# Patient Record
Sex: MALE | Race: WHITE | NOT HISPANIC OR LATINO | Employment: FULL TIME | URBAN - METROPOLITAN AREA
[De-identification: names, ages, dates, MRNs, and addresses within clinical notes are randomized per-mention and may not be internally consistent; named-entity substitution may affect disease eponyms.]

---

## 2023-12-19 ENCOUNTER — CONSULT (OUTPATIENT)
Dept: ENDOCRINOLOGY | Facility: CLINIC | Age: 28
End: 2023-12-19
Payer: COMMERCIAL

## 2023-12-19 ENCOUNTER — LAB (OUTPATIENT)
Dept: LAB | Facility: LAB | Age: 28
End: 2023-12-19
Payer: COMMERCIAL

## 2023-12-19 VITALS
SYSTOLIC BLOOD PRESSURE: 144 MMHG | HEIGHT: 73 IN | TEMPERATURE: 98.3 F | WEIGHT: 151 LBS | DIASTOLIC BLOOD PRESSURE: 84 MMHG | HEART RATE: 47 BPM | BODY MASS INDEX: 20.01 KG/M2

## 2023-12-19 DIAGNOSIS — Z31.41 FERTILITY TESTING: ICD-10-CM

## 2023-12-19 DIAGNOSIS — Z11.3 ENCOUNTER FOR SCREENING EXAMINATION FOR SEXUALLY TRANSMITTED DISEASE: Primary | ICD-10-CM

## 2023-12-19 DIAGNOSIS — Z11.3 ENCOUNTER FOR SCREENING EXAMINATION FOR SEXUALLY TRANSMITTED DISEASE: ICD-10-CM

## 2023-12-19 DIAGNOSIS — Z13.71 SCREENING FOR GENETIC DISEASE CARRIER STATUS: ICD-10-CM

## 2023-12-19 LAB
HBV SURFACE AG SERPL QL IA: NONREACTIVE
HCV AB SER QL: NONREACTIVE
T PALLIDUM AB SER QL: NONREACTIVE

## 2023-12-19 PROCEDURE — 99212 OFFICE O/P EST SF 10 MIN: CPT | Performed by: NURSE PRACTITIONER

## 2023-12-19 PROCEDURE — 87800 DETECT AGNT MULT DNA DIREC: CPT

## 2023-12-19 PROCEDURE — 99202 OFFICE O/P NEW SF 15 MIN: CPT | Performed by: NURSE PRACTITIONER

## 2023-12-19 PROCEDURE — 86780 TREPONEMA PALLIDUM: CPT

## 2023-12-19 PROCEDURE — 87340 HEPATITIS B SURFACE AG IA: CPT

## 2023-12-19 PROCEDURE — 86803 HEPATITIS C AB TEST: CPT

## 2023-12-19 PROCEDURE — 87389 HIV-1 AG W/HIV-1&-2 AB AG IA: CPT

## 2023-12-19 RX ORDER — BISMUTH SUBSALICYLATE 262 MG
1 TABLET,CHEWABLE ORAL DAILY
COMMUNITY

## 2023-12-19 RX ORDER — UBIDECARENONE 30 MG
30 CAPSULE ORAL DAILY
COMMUNITY

## 2023-12-19 ASSESSMENT — PAIN SCALES - GENERAL: PAINLEVEL: 0-NO PAIN

## 2023-12-20 LAB
C TRACH RRNA SPEC QL NAA+PROBE: NEGATIVE
HIV 1+2 AB+HIV1 P24 AG SERPL QL IA: NONREACTIVE
N GONORRHOEA DNA SPEC QL PROBE+SIG AMP: NEGATIVE

## 2023-12-28 ENCOUNTER — APPOINTMENT (OUTPATIENT)
Dept: ENDOCRINOLOGY | Facility: CLINIC | Age: 28
End: 2023-12-28
Payer: COMMERCIAL

## 2023-12-28 ENCOUNTER — ANCILLARY PROCEDURE (OUTPATIENT)
Dept: ENDOCRINOLOGY | Facility: CLINIC | Age: 28
End: 2023-12-28
Payer: COMMERCIAL

## 2023-12-28 DIAGNOSIS — Z31.41 FERTILITY TESTING: ICD-10-CM

## 2023-12-28 LAB
ABSTINENCE (DAYS): 2 DAYS (ref 2–7)
AGGLUTINATION (SEMEN): NO
ANALYZED TIME:: ABNORMAL
ANDROLOGY LAB ID#: ABNORMAL
CLUMPS (SEMEN): NO
COLLECTED COMPLETELY: YES
COLLECTION LOCATION:: ABNORMAL
COLLECTION METHOD:: ABNORMAL
CONCENTRATION(SEMEN): 0.17 MILL/ML (ref 15–?)
DEBRIS (SEMEN): YES
LEUKOCYTE (SEMEN): ABNORMAL
NON PROG. MOTILITY (SEMEN): 0 %
PROG. MOTILITY (SEMEN): 17 % (ref 32–?)
RECEIVED TIME:: ABNORMAL
SEMEN APPEARANCE: NORMAL
SEMEN LIQUEFACTION: NORMAL
SEMEN VISCOSITY: NORMAL
TOTAL MOTILITY (SEMEN): 17 % (ref 40–?)
TOTAL NO OF MOTILE (SEMEN): 0.11 MILL
TOTAL NO OF SPERM (SEMEN): 0.61 MILL (ref 39–?)
VOLUME (SEMEN): 3.6 ML (ref 1.5–?)

## 2023-12-28 PROCEDURE — 89321 SEMEN ANAL SPERM DETECTION: CPT | Performed by: OBSTETRICS & GYNECOLOGY

## 2024-01-04 ENCOUNTER — APPOINTMENT (OUTPATIENT)
Dept: ENDOCRINOLOGY | Facility: CLINIC | Age: 29
End: 2024-01-04
Payer: COMMERCIAL

## 2024-01-05 NOTE — PROGRESS NOTES
Pt is a 28 year old male presenting with partner Asia Bobo for fertility evaluation.     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    Prior fertility history:  none  PMH:  none  PSH:  none  Smoking:Partner: Recent or current tobacco use: No    Alcohol Use: Partner: Recent or current alcohol use: No    Drug Use: Partner: Recent or current drug use: Yes  Mariajuana Gummy and Vape     Medications: Partner Medications: Coq10, MVI, fish oil    Injuries: Partner: Any history of surgeries or injuries in the reproductive area?: No    STD: Have you ever been diagnosed with a sexually transmitted disease?: No    Please select all that are applicable:    SA: Yes at home, abnormal   SA Results:  abnormal per pt     28 year old male with partner Asia Bobo for fertility evaluation.   Abnormal at home Semen Analysis     Discussed Male Vitamins as follows:  Co-Q10   200 mg daily  L-Carnitine 200-500 mg daily  Vitamin C 500 mg daily  Recommend Consult with Dr. Cabrera [phone]  Recommend repeat SA with 48-72 hours abstinence      Plan:   Yes Semen Analysis: Ordered  Yes Genetic screening: Ordered    FOLLOW UP   Consults: Schedule consult with Dr. Cabrera

## 2024-01-30 ENCOUNTER — LAB (OUTPATIENT)
Dept: LAB | Facility: LAB | Age: 29
End: 2024-01-30
Payer: COMMERCIAL

## 2024-01-30 ENCOUNTER — OFFICE VISIT (OUTPATIENT)
Dept: UROLOGY | Facility: CLINIC | Age: 29
End: 2024-01-30
Payer: COMMERCIAL

## 2024-01-30 VITALS — BODY MASS INDEX: 19.92 KG/M2 | TEMPERATURE: 98 F | HEIGHT: 73 IN

## 2024-01-30 DIAGNOSIS — Z11.3 SCREENING EXAMINATION FOR STD (SEXUALLY TRANSMITTED DISEASE): ICD-10-CM

## 2024-01-30 DIAGNOSIS — N46.01 AZOOSPERMIA: ICD-10-CM

## 2024-01-30 DIAGNOSIS — I86.1 VARICOCELE: ICD-10-CM

## 2024-01-30 DIAGNOSIS — N46.11 OLIGOSPERMIA: ICD-10-CM

## 2024-01-30 DIAGNOSIS — N46.9 INFERTILITY MALE: ICD-10-CM

## 2024-01-30 DIAGNOSIS — F17.200 VAPING NICOTINE DEPENDENCE, NON-TOBACCO PRODUCT: ICD-10-CM

## 2024-01-30 DIAGNOSIS — N46.9 INFERTILITY MALE: Primary | ICD-10-CM

## 2024-01-30 DIAGNOSIS — R68.82 DECREASED LIBIDO: ICD-10-CM

## 2024-01-30 LAB
ESTRADIOL SERPL-MCNC: 27 PG/ML
FSH SERPL-ACNC: 7.2 IU/L
HBV SURFACE AB SER-ACNC: 7.1 MIU/ML
HBV SURFACE AG SERPL QL IA: NONREACTIVE
HCV AB SER QL: NONREACTIVE
HIV 1+2 AB+HIV1 P24 AG SERPL QL IA: NONREACTIVE
LH SERPL-ACNC: 1.1 IU/L
PROLACTIN SERPL-MCNC: 3.7 UG/L (ref 2–18)
TESTOST SERPL-MCNC: 270 NG/DL (ref 240–1000)
TREPONEMA PALLIDUM IGG+IGM AB [PRESENCE] IN SERUM OR PLASMA BY IMMUNOASSAY: NONREACTIVE

## 2024-01-30 PROCEDURE — 99406 BEHAV CHNG SMOKING 3-10 MIN: CPT | Performed by: UROLOGY

## 2024-01-30 PROCEDURE — 36415 COLL VENOUS BLD VENIPUNCTURE: CPT

## 2024-01-30 PROCEDURE — 83001 ASSAY OF GONADOTROPIN (FSH): CPT

## 2024-01-30 PROCEDURE — 87340 HEPATITIS B SURFACE AG IA: CPT

## 2024-01-30 PROCEDURE — 99204 OFFICE O/P NEW MOD 45 MIN: CPT | Performed by: UROLOGY

## 2024-01-30 PROCEDURE — 86803 HEPATITIS C AB TEST: CPT

## 2024-01-30 PROCEDURE — 81403 MOPATH PROCEDURE LEVEL 4: CPT

## 2024-01-30 PROCEDURE — 82670 ASSAY OF TOTAL ESTRADIOL: CPT

## 2024-01-30 PROCEDURE — 88262 CHROMOSOME ANALYSIS 15-20: CPT

## 2024-01-30 PROCEDURE — 84146 ASSAY OF PROLACTIN: CPT

## 2024-01-30 PROCEDURE — 88230 TISSUE CULTURE LYMPHOCYTE: CPT

## 2024-01-30 PROCEDURE — 87389 HIV-1 AG W/HIV-1&-2 AB AG IA: CPT

## 2024-01-30 PROCEDURE — 88289 CHROMOSOME STUDY ADDITIONAL: CPT

## 2024-01-30 PROCEDURE — 83002 ASSAY OF GONADOTROPIN (LH): CPT

## 2024-01-30 PROCEDURE — 84403 ASSAY OF TOTAL TESTOSTERONE: CPT

## 2024-01-30 PROCEDURE — 86780 TREPONEMA PALLIDUM: CPT

## 2024-01-30 PROCEDURE — 1036F TOBACCO NON-USER: CPT | Performed by: UROLOGY

## 2024-01-30 PROCEDURE — 86706 HEP B SURFACE ANTIBODY: CPT

## 2024-01-30 PROCEDURE — 87800 DETECT AGNT MULT DNA DIREC: CPT

## 2024-01-30 PROCEDURE — 88280 CHROMOSOME KARYOTYPE STUDY: CPT

## 2024-01-30 ASSESSMENT — PAIN SCALES - GENERAL: PAINLEVEL: 0-NO PAIN

## 2024-01-30 NOTE — PROGRESS NOTES
"HPI:  29 y.o. yo male  referred to me by   *** for infertility    Partner/wife name:   Partner/wife  :   Attempting Pregnancy for :   months  Previous pregnancies for either partner:     Previous Semen analysis / Labs:     No results found for: \"TESTOSTERONE\"  No results found for: \"LH\"  No results found for: \"FSH\"  No components found for: \"ESTRADIAL\"  No results found for: \"PROLACTIN\"    PREVIOUS RISK FACTORS  History of testicular exposure to chemicals, radiation, or toxins: None  History of high fever, epididymitis, orchitis, prostatitis, sexually transmitted diseases, or trauma to the testicles: None  History of a varicocele, testicular torsion, cryptorchidism, postpubertile mumps or a family history of infertility: None  Coital Frequency:   Coital Lubricants: None  Problems with erections or ejaculation: None  Smoker?  Previous Testosterone or anabolic steroid Use: none      PRIOR Assisted Reproductive Technology:  None      PMH:  No past medical history on file.     PSH:  No past surgical history on file.     Medications:    Current Outpatient Medications:     co-enzyme Q-10 30 mg capsule, Take 1 capsule (30 mg) by mouth once daily., Disp: , Rfl:     docosahexaenoic acid/epa (FISH OIL ORAL), Take by mouth., Disp: , Rfl:     multivitamin tablet, Take 1 tablet by mouth once daily., Disp: , Rfl:     vitamin D3-vitamin K2, MK4, 1,000-100 unit-mcg tablet, Take 1,000 Units by mouth once daily., Disp: , Rfl:     Allergy:  No Known Allergies     Exam  Testicles descended bilaterally, nontender, no masses  Vasa palpable bilaterally  Penis circ'd, no lesions, no plaques      Assessment/Plan  #infertility  I counseled the patient in detail regarding infertility, specifically the male component. We reviewed different causes of male infertility as well as options to optimize male fertility, to different surgical interventions and assisted reproductive technologies.    Semen analysis with strict morphology x " 2.  Fertility Panel:   serum Estradiol, FSH + LH, serum Prolactin, Testosterone   I have scheduled a scrotal ultrasound to better evaluate the testicles, epididymis, and spermatic cords.  Karyotype and y-chromosome microdeletion   Evaluate post-ejaculatory urine / retrograde analysis  CFTR testing  Semen culture  DNA fragmentation    Fu after    Scribe Attestation  By signing my name below, I, Michelle Corona, attest that this documentation  has been prepared under the direction and in the presence of Teresa Cabrera MD.

## 2024-01-30 NOTE — PROGRESS NOTES
HPI:  29 y.o. yo male  referred to me by Dr. Sarah Leger for infertility    -wife is seeing Dr. Sarah Leger @ [facility]  -has cut down on vaping and marijuana usage since home sperm test showed low counts  -has anxiety around trying for pregnancy due to wife carrying BRCA gene  -recent trouble with libido and mild erection troubles    Partner/wife name: Asia Bobo  Partner/wife  : 3/14/94  Attempting Pregnancy for : 5  months  Previous pregnancies for either partner: 0    Previous Semen analysis / Labs:   Component      Latest Ref Rng 2023   Volume (Semen)      1.5 mL 3.6    Concentration(Semen)      15 mill/mL 0.17 !    Total Motility (Semen)      40 % 17 !    Prog. Motility (Semen)      32 % 17 !    Non Prog. Motility (Semen)      % 0    Total No of Sperm (Semen)      39 mill 0.61 !    Total No of Motile (Semen)      mill 0.11      Component      Latest Ref Rng 2023   Hepatitis B Surface AG      Nonreactive  Nonreactive    Hepatitis C AB      Nonreactive  Nonreactive    HIV 1/2 Antigen/Antibody Screen with Reflex to Confirmation      Nonreactive  Nonreactive    Syphilis Total Ab      Nonreactive  Nonreactive        PREVIOUS RISK FACTORS  History of testicular exposure to chemicals, radiation, or toxins: None  History of high fever, epididymitis, orchitis, prostatitis, sexually transmitted diseases, or trauma to the testicles: None  History of a varicocele, testicular torsion, cryptorchidism, postpubertile mumps or a family history of infertility: None  Coital Frequency:   Coital Lubricants: None  Problems with erections or ejaculation: None  Smoker?  Previous Testosterone or anabolic steroid Use: none      PRIOR Assisted Reproductive Technology:  None      PMH:  No past medical history on file.     PSH:  No past surgical history on file.     Medications:    Current Outpatient Medications:     co-enzyme Q-10 30 mg capsule, Take 1 capsule (30 mg) by mouth once daily., Disp: , Rfl:     docosahexaenoic  acid/epa (FISH OIL ORAL), Take by mouth., Disp: , Rfl:     multivitamin tablet, Take 1 tablet by mouth once daily., Disp: , Rfl:     vitamin D3-vitamin K2, MK4, 1,000-100 unit-mcg tablet, Take 1,000 Units by mouth once daily., Disp: , Rfl:     Allergy:  No Known Allergies     Exam  Testicles descended bilaterally, 15cc, nontender, no masses  Grade 3 varicocele on Left  Grade 2 varicocele on Right  Vasa palpable bilaterally  Penis circ'd, no lesions, no plaques    Assessment/Plan  #infertility/oligospermia   I counseled the patient in detail regarding infertility, specifically the male component. We reviewed different causes of male infertility as well as options to optimize male fertility, to different surgical interventions and assisted reproductive technologies.    Semen analysis with sperm banking.  Fertility Panel:   serum Estradiol, FSH + LH, serum Prolactin, Testosterone   I have scheduled a scrotal ultrasound to better evaluate the testicles, epididymis, and spermatic cords.  Karyotype and y-chromosome microdeletion     #Varicocele - bilateral  -discussed varicocele repair in detail  -advised the patient that I recommend sperm banking prior to repair due to the possibility of repair decreasing his sperm counts even further    #Decreased Libido  -will obtain fertility panel to check T level  -discussed cessation of vaping and marijuana usage in the setting of decreased libido and erectile dysfunction    #Vaping Cessation   -Encouraged cessation of smoking.      Fu after testing    Scribe Attestation  By signing my name below, I, Michelle Corona, attest that this documentation  has been prepared under the direction and in the presence of Teresa Cabrera MD.

## 2024-01-31 ENCOUNTER — HOSPITAL ENCOUNTER (OUTPATIENT)
Dept: RADIOLOGY | Facility: CLINIC | Age: 29
Discharge: HOME | End: 2024-01-31
Payer: COMMERCIAL

## 2024-01-31 DIAGNOSIS — I86.1 VARICOCELE: ICD-10-CM

## 2024-01-31 LAB
C TRACH RRNA SPEC QL NAA+PROBE: NEGATIVE
N GONORRHOEA DNA SPEC QL PROBE+SIG AMP: NEGATIVE

## 2024-01-31 PROCEDURE — 93976 VASCULAR STUDY: CPT | Performed by: RADIOLOGY

## 2024-01-31 PROCEDURE — 93975 VASCULAR STUDY: CPT

## 2024-01-31 PROCEDURE — 76870 US EXAM SCROTUM: CPT | Performed by: RADIOLOGY

## 2024-02-05 ENCOUNTER — APPOINTMENT (OUTPATIENT)
Dept: UROLOGY | Facility: HOSPITAL | Age: 29
End: 2024-02-05
Payer: COMMERCIAL

## 2024-02-08 ENCOUNTER — APPOINTMENT (OUTPATIENT)
Dept: ENDOCRINOLOGY | Facility: CLINIC | Age: 29
End: 2024-02-08
Payer: COMMERCIAL

## 2024-02-12 LAB
ELECTRONICALLY SIGNED BY: NORMAL
Y CHROM DEL BLD/T: NORMAL

## 2024-02-27 ENCOUNTER — APPOINTMENT (OUTPATIENT)
Dept: UROLOGY | Facility: CLINIC | Age: 29
End: 2024-02-27
Payer: COMMERCIAL

## 2024-03-21 LAB
CELLS ANALYZED: 6 CELLS
CHROM ANALY OVERALL INTERP-IMP: NORMAL
CHROMOS CYTO BASIC ASSOC OBS PNL BLD/T: 5 CELLS
CHROMOSOME ANALYSIS MASTER PANEL: 0 CELLS
CHROMOSOME ANALYSIS MASTER PANEL: 0 CELLS
CHROMOSOME ANALYSIS MASTER PANEL: 46 CHROMOSOMES
CHROMOSOME ANALYSIS MASTER PANEL: NORMAL
ELECTRONICALLY SIGNED BY CYTOGENETICS: NORMAL
ISCN BAND LEVEL QL: 550 BANDS
KARYOTYP BLD/T: 3 CELLS
TOTAL CELLS COUNTED BLD: 30 CELLS

## 2024-10-18 DIAGNOSIS — N46.9 INFERTILITY MALE: ICD-10-CM

## 2025-07-18 ENCOUNTER — APPOINTMENT (OUTPATIENT)
Dept: PRIMARY CARE | Facility: CLINIC | Age: 30
End: 2025-07-18
Payer: COMMERCIAL

## 2025-07-18 VITALS
HEART RATE: 54 BPM | BODY MASS INDEX: 20.01 KG/M2 | HEIGHT: 73 IN | DIASTOLIC BLOOD PRESSURE: 85 MMHG | SYSTOLIC BLOOD PRESSURE: 134 MMHG | WEIGHT: 151 LBS | OXYGEN SATURATION: 98 %

## 2025-07-18 DIAGNOSIS — Z00.00 HEALTH CARE MAINTENANCE: Primary | ICD-10-CM

## 2025-07-18 DIAGNOSIS — E29.1 HYPOGONADISM IN MALE: ICD-10-CM

## 2025-07-18 DIAGNOSIS — N63.20 MASS OF LEFT BREAST, UNSPECIFIED QUADRANT: ICD-10-CM

## 2025-07-18 DIAGNOSIS — K64.4 EXTERNAL HEMORRHOID: ICD-10-CM

## 2025-07-18 PROCEDURE — 1036F TOBACCO NON-USER: CPT | Performed by: INTERNAL MEDICINE

## 2025-07-18 PROCEDURE — 3008F BODY MASS INDEX DOCD: CPT | Performed by: INTERNAL MEDICINE

## 2025-07-18 PROCEDURE — 99395 PREV VISIT EST AGE 18-39: CPT | Performed by: INTERNAL MEDICINE

## 2025-07-18 RX ORDER — HYDROCORTISONE 25 MG/G
CREAM TOPICAL 4 TIMES DAILY PRN
Qty: 30 G | Refills: 0 | Status: SHIPPED | OUTPATIENT
Start: 2025-07-18 | End: 2026-07-18

## 2025-07-18 ASSESSMENT — PATIENT HEALTH QUESTIONNAIRE - PHQ9
2. FEELING DOWN, DEPRESSED OR HOPELESS: NOT AT ALL
SUM OF ALL RESPONSES TO PHQ9 QUESTIONS 1 AND 2: 0
1. LITTLE INTEREST OR PLEASURE IN DOING THINGS: NOT AT ALL

## 2025-07-18 NOTE — PROGRESS NOTES
"Subjective   Patient ID: Rex Jimenez is a 30 y.o. male who presents for Annual Exam.          HPI   Patient is a 30-year-old male with past medical history of hypogonadism presents for wellness exam.  Patient has not been seen in over a year.  He works as a [employer] .  He is trained as a physical therapist.  He is  and has a 4-month-old at home.  He is getting at least 6 hours of sleep.  Uses CBD Gummies.  No smoking.  No excessive alcohol.  Exercises regularly.  He is concerned about a mass of the left breast has been present for several months.  Comes and goes.  No drainage from the nipple.    Also complaining of irritation of the anal area.  He feels a lump in the area.  No bleeding.  No history of colonoscopies.        Review of Systems  Constitutional: No fever or chills, No Night Sweats  Eyes: No Blurry Vision or Eye sight problems  ENT: No Nasal Discharge, Hoarseness, sore throat  Cardiovascular: no chest pain, no palpitations and no syncope.   Respiratory: no cough, no shortness of breath during exertion and no shortness of breath at rest.   Gastrointestinal: no abdominal pain, no nausea and no vomiting.   : No issues with urinary stream, burning with urination, no blood in urine or stools  Skin: No Skin rashes or Lesions  Neuro: No Headache, no dizziness or Numbness or tingling  Psych: No Anxiety, depression or sleeping problems  Heme: No Easy bleeding or brusing.     Objective   /85   Pulse 54   Ht 1.854 m (6' 1\")   Wt 68.5 kg (151 lb)   SpO2 98%   BMI 19.92 kg/m²     Physical Exam  Constitutional: Alert and in no acute distress. Well developed, well nourished.   Head and Face: Head and face: Normal.    Eyes: Normal external exam. Pupils were equal in size, round, reactive to light (PERRL) with normal accommodation and extraocular movements intact (EOMI).   Ears, Nose, Mouth, and Throat: External inspection of ears and nose: Normal.  Hearing: Normal.  Nasal mucosa, " septum, and turbinates: Normal.  Lips, teeth, and gums: Normal.  Oropharynx: Normal.   Neck: No neck mass was observed. Supple. Thyroid not enlarged and there were no palpable thyroid nodules.   Cardiovascular: Heart rate and rhythm were normal, normal S1 and S2. Pedal pulses: Normal. No peripheral edema.   Pulmonary: No respiratory distress. Clear bilateral breath sounds.   Abdomen: Soft nontender; no abdominal mass palpated. Normal bowel sounds. No organomegaly.  External hemorrhoid noted rather large  : Deferred  Musculoskeletal: No joint swelling seen, normal movements of all extremities. Range of motion: Normal.  Muscle strength/tone: Normal.    Skin: Normal skin color and pigmentation, normal skin turgor, and no rash.   Neurologic: Deep tendon reflexes were 2+ and symmetric.   Psychiatric: Judgment and insight: Intact. Mood and affect: Normal.  Lymphatic: No cervical lymphadenopathy. Palpation of lymph nodes in axillae: Normal.  Palpation of lymph nodes in groin: Normal.    Lab Results   Component Value Date    WBC 4.5 07/22/2022    HGB 15.4 07/22/2022    HCT 47.2 07/22/2022     07/22/2022    CHOL 146 07/22/2022    TRIG 52 07/22/2022    HDL 50.7 07/22/2022    ALT 16 07/22/2022    AST 17 07/22/2022     07/22/2022    K 4.1 07/22/2022     07/22/2022    CREATININE 0.98 07/22/2022    BUN 16 07/22/2022    CO2 27 07/22/2022    TSH 2.05 07/22/2022       US scrotum w doppler  Narrative: Interpreted By:  Wil Polanco,   STUDY:  US SCROTUM WITH DOPPLER  1/31/2024 8:46 am      INDICATION:  30 y/o   M with  Signs/Symptoms:bilateral varicoceles on exam.      COMPARISON:  None.      ACCESSION NUMBER(S):  [id number]      ORDERING CLINICIAN:  YISSEL BORRERO      TECHNIQUE:  Routine ultrasound of the scrotum was performed with duplex Doppler  (color and spectral) evaluation.   Static images were obtained for  remote interpretation.      FINDINGS:  RIGHT TESTICLE:  Size:  2.3 x 1.8x 3.5,4,3.5,4       Homogeneous in echotexture without focal lesion. It demonstrates  color/arterial flow.      LEFT TESTICLE:  Size:  2.5 x 1.6 x      Homogeneous in echotexture without focal lesion. It demonstrates  color/arterial flow.      EPIDIDYMIDES:   2 right-sided epididymal cysts/spermatoceles  measuring up to 3 mm in size. Heterogeneous left epididymis without a  discrete solid or cystic lesion.      HYDROCELE:   Trace right-sided hydrocele.      Small bilateral varicoceles are noted, measuring up to approximately  3.6 mm in diameter on the left.      Impression: Trace right hydrocele. Bilateral varicoceles. No intratesticular  mass. Tiny right epididymal cysts/spermatoceles.      MACRO:  None.      Signed by: Wil Polanco 2/1/2024 5:54 PM  Dictation workstation:   [id number]      Assessment/Plan   Problem List Items Addressed This Visit    None  Visit Diagnoses         Codes      Health care maintenance    -  Primary Z00.00    Relevant Orders    Comprehensive metabolic panel    Lipid Panel    Lipoprotein A (LPA)    Testosterone, total and free    Estradiol    Luteinizing hormone    FSH      Mass of left breast, unspecified quadrant     N63.20    Relevant Orders    BI US breast complete left    FSH      Hypogonadism in male     E29.1    Relevant Orders    Testosterone, total and free    Estradiol    Luteinizing hormone    FSH      External hemorrhoid     K64.4    Relevant Medications    hydrocortisone (Anusol-HC) 2.5 % rectal cream    Other Relevant Orders    Referral to Colorectal Surgery          Given history of hypogonadism we will recheck his sex hormones.  Continue following with urology as recommended.    Will refer to colorectal team given the size of the external hemorrhoid.  Start Anusol cream twice daily for 7 days.  Avoid straining on the toilet.  Colace for constipation    Given the left breast mass will obtain ultrasound of the breast.  Likely an epidermal cyst    Dear Rex Jimenez     It was my pleasure  to take care of you today in the office. Below are the things we discussed today:    1. Immunizations: Yearly Flu shot is recommended.       2. Blood Work: Ordered  3. Seen your dentist twice a year  4. Yearly Eye exam is recommended    5. BMI: 19.9  6: Diet recommendations:   Eat Clean, Try to have as many home cooked meals as possible  Avoid processed foods which contain excess calories, sugar, and sodium.    7. Exercise recommendations:   150 minutes a week to maintain your weight     If you have to loose weight, you need a better diet and exercise plan.     8. Please get your Living will / Advance directive completed if you do not have one already. Please make sure our office has a copy of the latest one.     9. Colonoscopy: N/A  10. PSA: N/A    11.  Follow-up annually or as needed    Follow up in one year for a Physical. Please call the office before your Physical to see if you need blood work completed prior to your physical.     Please call me if any questions arise from now until your next visit. I will call you after I am done seeing patients. A Doctor is always available by phone when the office is closed. Please feel free to call for help with any problem that you feel shouldn't wait until the office re-opens.

## 2025-07-18 NOTE — PATIENT INSTRUCTIONS
We kindly ask that you take the lead and scheduling your referral appointments to ensure they align best with your availability by calling [phone].  For laboratory tests we encourage you to schedule an appointment online [email or web address] but walk-ins are available as well.  For radiology testing you can call [phone] or [phone] to schedule.  If for any reason you are having difficulty scheduling your appointments please feel free to reach out at our office by calling [phone] to assist further

## 2025-08-01 ENCOUNTER — OFFICE VISIT (OUTPATIENT)
Dept: SURGERY | Facility: CLINIC | Age: 30
End: 2025-08-01
Payer: COMMERCIAL

## 2025-08-01 ENCOUNTER — TELEPHONE (OUTPATIENT)
Dept: SURGERY | Facility: HOSPITAL | Age: 30
End: 2025-08-01

## 2025-08-01 VITALS
DIASTOLIC BLOOD PRESSURE: 96 MMHG | BODY MASS INDEX: 20.41 KG/M2 | WEIGHT: 154 LBS | HEIGHT: 73 IN | HEART RATE: 55 BPM | SYSTOLIC BLOOD PRESSURE: 149 MMHG

## 2025-08-01 DIAGNOSIS — K64.8 BLEEDING INTERNAL HEMORRHOIDS: ICD-10-CM

## 2025-08-01 DIAGNOSIS — L91.8 FIBROEPITHELIAL POLYP: Primary | ICD-10-CM

## 2025-08-01 PROCEDURE — 99213 OFFICE O/P EST LOW 20 MIN: CPT | Mod: 25 | Performed by: NURSE PRACTITIONER

## 2025-08-01 PROCEDURE — 46600 DIAGNOSTIC ANOSCOPY SPX: CPT | Performed by: NURSE PRACTITIONER

## 2025-08-01 PROCEDURE — 3008F BODY MASS INDEX DOCD: CPT | Performed by: NURSE PRACTITIONER

## 2025-08-01 PROCEDURE — 99203 OFFICE O/P NEW LOW 30 MIN: CPT | Performed by: NURSE PRACTITIONER

## 2025-08-01 RX ORDER — HYDROCORTISONE ACETATE 25 MG/1
25 SUPPOSITORY RECTAL 2 TIMES DAILY
Qty: 28 SUPPOSITORY | Refills: 0 | Status: SHIPPED | OUTPATIENT
Start: 2025-08-01 | End: 2025-08-15

## 2025-08-01 NOTE — PROGRESS NOTES
History Of Present Illness  Rex Jimenez is a 30 y.o. male presenting with ***.   He has an external hemmrorhoid thathe has had for about 5 years that would flare up aoff and on.  Now the hemorrhoid is flaring every day.  He will have a little bleeding as well with every BM.  He has not used any OTC medications.      He will have 1 soft-hard BM every day witih rare straining.  He can sit long  No fiber  No accid  No hx of any perianal surger    Colon/EGD 2018 for stomach issues    No family hx of colorectal cancer.      Past Medical History  He has no past medical history on file.    Surgical History  He has no past surgical history on file.     Social History  He reports that he has never smoked. He has never used smokeless tobacco. He reports current alcohol use. He reports current drug use. Drug: Marijuana.    Family History  Family History[1]     Allergies  Patient has no known allergies.    Review of Systems     Physical Exam     Last Recorded Vitals  BP (!) 149/96   Pulse 55   Wt 69.9 kg (154 lb)     Relevant Results  {If you would like to pull in Medications, type .meds     If you would like to pull in Lab results for the last 24 hours, type .[id number]    If you would like to pull in Imaging results, type .imgrslt :99}    ***     Assessment/Plan   {Assess/PlanSmartLinks:[id number]}    ***       Alyssa Lawrence, DANIEL-CNP       [1] No family history on file.     Procedure explained and questions answered to patient or proxy's satisfaction: yes      Patient identity confirmed:  Verbally with patient  Post-procedure details:     Procedure completion:  Tolerated  Comments:      He has 2 tiny external hemorrhoid in the left lateral position and a long fibroepithelial polyp in the posterior m;midline.  Good tone on NELDA.  On anoscopy, looking in 360 degrees, he has bleeding and inflamed internal hemorrhoids         Last Recorded Vitals  BP (!) 149/96   Pulse 55   Wt 69.9 kg (154 lb)          Assessment/Plan   Rex has a long fibroepithelial polyp in the posterior midline and bleeding internal hemorrhoids.  He will start using Hydrocortisone suppositories Bid for 2 weeks.  We talked about the risks of removing the polyp and he would like to proceed.  He will schedule to see Dr. Bustos in the near future to have that done.       Alyssa Lawrence, DANIEL-CNP         [1] No family history on file.

## 2025-08-06 ENCOUNTER — HOSPITAL ENCOUNTER (OUTPATIENT)
Dept: RADIOLOGY | Facility: CLINIC | Age: 30
Discharge: HOME | End: 2025-08-06
Payer: COMMERCIAL

## 2025-08-06 ENCOUNTER — HOSPITAL ENCOUNTER (OUTPATIENT)
Facility: HOSPITAL | Age: 30
Setting detail: OUTPATIENT SURGERY
End: 2025-08-06
Attending: SURGERY | Admitting: SURGERY
Payer: COMMERCIAL

## 2025-08-06 ENCOUNTER — PREP FOR PROCEDURE (OUTPATIENT)
Dept: SURGERY | Facility: HOSPITAL | Age: 30
End: 2025-08-06
Payer: COMMERCIAL

## 2025-08-06 DIAGNOSIS — N63.20 MASS OF LEFT BREAST, UNSPECIFIED QUADRANT: ICD-10-CM

## 2025-08-06 DIAGNOSIS — N63.0 PALPABLE MASS OF BREAST: Primary | ICD-10-CM

## 2025-08-06 DIAGNOSIS — K64.4 CUTANEOUS TAG, HEMORRHOIDAL: Primary | ICD-10-CM

## 2025-08-12 ENCOUNTER — HOSPITAL ENCOUNTER (OUTPATIENT)
Dept: RADIOLOGY | Facility: CLINIC | Age: 30
End: 2025-08-12
Payer: COMMERCIAL

## 2025-08-12 ENCOUNTER — HOSPITAL ENCOUNTER (OUTPATIENT)
Dept: RADIOLOGY | Facility: CLINIC | Age: 30
Discharge: HOME | End: 2025-08-12
Payer: COMMERCIAL

## 2025-08-12 VITALS — WEIGHT: 154.1 LBS | HEIGHT: 73 IN | BODY MASS INDEX: 20.42 KG/M2

## 2025-08-12 DIAGNOSIS — N63.0 PALPABLE MASS OF BREAST: ICD-10-CM

## 2025-08-12 PROCEDURE — 77062 BREAST TOMOSYNTHESIS BI: CPT | Performed by: RADIOLOGY

## 2025-08-12 PROCEDURE — 77066 DX MAMMO INCL CAD BI: CPT | Performed by: RADIOLOGY

## 2025-08-12 PROCEDURE — 77062 BREAST TOMOSYNTHESIS BI: CPT

## 2025-08-19 ENCOUNTER — OFFICE VISIT (OUTPATIENT)
Dept: SURGERY | Facility: CLINIC | Age: 30
End: 2025-08-19
Payer: COMMERCIAL

## 2025-08-19 VITALS
DIASTOLIC BLOOD PRESSURE: 93 MMHG | BODY MASS INDEX: 20.72 KG/M2 | SYSTOLIC BLOOD PRESSURE: 149 MMHG | HEART RATE: 93 BPM | WEIGHT: 157 LBS

## 2025-08-19 DIAGNOSIS — K64.4 CUTANEOUS TAG, HEMORRHOIDAL: Primary | ICD-10-CM

## 2025-08-19 PROCEDURE — 99214 OFFICE O/P EST MOD 30 MIN: CPT | Performed by: SURGERY

## 2025-08-19 PROCEDURE — 99213 OFFICE O/P EST LOW 20 MIN: CPT | Performed by: SURGERY

## 2025-08-19 ASSESSMENT — ENCOUNTER SYMPTOMS
NAUSEA: 0
CONSTIPATION: 0
DIARRHEA: 0
ANAL BLEEDING: 0
RECTAL PAIN: 0
BLOOD IN STOOL: 0
VOMITING: 0

## 2025-08-29 LAB
ALBUMIN SERPL-MCNC: 4.8 G/DL (ref 3.6–5.1)
ALP SERPL-CCNC: 61 U/L (ref 36–130)
ALT SERPL-CCNC: 26 U/L (ref 9–46)
ANION GAP SERPL CALCULATED.4IONS-SCNC: 9 MMOL/L (CALC) (ref 7–17)
AST SERPL-CCNC: 23 U/L (ref 10–40)
BILIRUB SERPL-MCNC: 0.4 MG/DL (ref 0.2–1.2)
BUN SERPL-MCNC: 14 MG/DL (ref 7–25)
CALCIUM SERPL-MCNC: 9.7 MG/DL (ref 8.6–10.3)
CHLORIDE SERPL-SCNC: 106 MMOL/L (ref 98–110)
CHOLEST SERPL-MCNC: 183 MG/DL
CHOLEST/HDLC SERPL: 3.3 (CALC)
CO2 SERPL-SCNC: 27 MMOL/L (ref 20–32)
CREAT SERPL-MCNC: 0.88 MG/DL (ref 0.6–1.26)
EGFRCR SERPLBLD CKD-EPI 2021: 118 ML/MIN/1.73M2
ESTRADIOL SERPL-MCNC: 39 PG/ML
FSH SERPL-ACNC: 8.1 MIU/ML (ref 1.4–12.8)
GLUCOSE SERPL-MCNC: 75 MG/DL (ref 65–99)
HDLC SERPL-MCNC: 56 MG/DL
LDLC SERPL CALC-MCNC: 106 MG/DL (CALC)
LH SERPL-ACNC: 1.5 MIU/ML (ref 1.5–9.3)
LPA SERPL-SCNC: NORMAL NMOL/L
NONHDLC SERPL-MCNC: 127 MG/DL (CALC)
POTASSIUM SERPL-SCNC: 4.7 MMOL/L (ref 3.5–5.3)
PROT SERPL-MCNC: 7.3 G/DL (ref 6.1–8.1)
SODIUM SERPL-SCNC: 142 MMOL/L (ref 135–146)
TESTOST FREE SERPL-MCNC: NORMAL PG/ML
TESTOST SERPL-MCNC: NORMAL NG/DL
TRIGL SERPL-MCNC: 112 MG/DL

## 2025-09-01 LAB
ALBUMIN SERPL-MCNC: 4.8 G/DL (ref 3.6–5.1)
ALP SERPL-CCNC: 61 U/L (ref 36–130)
ALT SERPL-CCNC: 26 U/L (ref 9–46)
ANION GAP SERPL CALCULATED.4IONS-SCNC: 9 MMOL/L (CALC) (ref 7–17)
AST SERPL-CCNC: 23 U/L (ref 10–40)
BILIRUB SERPL-MCNC: 0.4 MG/DL (ref 0.2–1.2)
BUN SERPL-MCNC: 14 MG/DL (ref 7–25)
CALCIUM SERPL-MCNC: 9.7 MG/DL (ref 8.6–10.3)
CHLORIDE SERPL-SCNC: 106 MMOL/L (ref 98–110)
CHOLEST SERPL-MCNC: 183 MG/DL
CHOLEST/HDLC SERPL: 3.3 (CALC)
CO2 SERPL-SCNC: 27 MMOL/L (ref 20–32)
CREAT SERPL-MCNC: 0.88 MG/DL (ref 0.6–1.26)
EGFRCR SERPLBLD CKD-EPI 2021: 118 ML/MIN/1.73M2
ESTRADIOL SERPL-MCNC: 39 PG/ML
FSH SERPL-ACNC: 8.1 MIU/ML (ref 1.4–12.8)
GLUCOSE SERPL-MCNC: 75 MG/DL (ref 65–99)
HDLC SERPL-MCNC: 56 MG/DL
LDLC SERPL CALC-MCNC: 106 MG/DL (CALC)
LH SERPL-ACNC: 1.5 MIU/ML (ref 1.5–9.3)
LPA SERPL-SCNC: 152 NMOL/L
NONHDLC SERPL-MCNC: 127 MG/DL (CALC)
POTASSIUM SERPL-SCNC: 4.7 MMOL/L (ref 3.5–5.3)
PROT SERPL-MCNC: 7.3 G/DL (ref 6.1–8.1)
SODIUM SERPL-SCNC: 142 MMOL/L (ref 135–146)
TESTOST FREE SERPL-MCNC: NORMAL PG/ML
TESTOST SERPL-MCNC: NORMAL NG/DL
TRIGL SERPL-MCNC: 112 MG/DL